# Patient Record
Sex: FEMALE | Race: WHITE | URBAN - NONMETROPOLITAN AREA
[De-identification: names, ages, dates, MRNs, and addresses within clinical notes are randomized per-mention and may not be internally consistent; named-entity substitution may affect disease eponyms.]

---
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
## 2023-05-02 ENCOUNTER — OFFICE VISIT (OUTPATIENT)
Dept: URGENT CARE | Facility: CLINIC | Age: 18
End: 2023-05-02

## 2023-05-02 VITALS
DIASTOLIC BLOOD PRESSURE: 74 MMHG | OXYGEN SATURATION: 96 % | RESPIRATION RATE: 18 BRPM | HEART RATE: 94 BPM | HEIGHT: 67 IN | SYSTOLIC BLOOD PRESSURE: 137 MMHG | WEIGHT: 135 LBS | TEMPERATURE: 97.4 F | BODY MASS INDEX: 21.19 KG/M2

## 2023-05-02 DIAGNOSIS — Z02.4 ENCOUNTER FOR DRIVER'S LICENSE HISTORY AND PHYSICAL: Primary | ICD-10-CM

## 2023-05-02 NOTE — PROGRESS NOTES
[facility]        NAME: Alon Brown is a 19 y o  female  : 2005    MRN: [id number]  DATE: May 2, 2023  TIME: 2:08 PM    Assessment and Plan   Encounter for 's license history and physical [Z02 4]  1  Encounter for 's license history and physical              Patient Instructions       Follow up with PCP in 3-5 days  Proceed to  ER if symptoms worsen  Chief Complaint     Chief Complaint   Patient presents with    Annual Exam     Drivers permit physical         History of Present Illness       Patient is a [phone] presenting for a drivers permit physical  She has no past medical history and does not take any daily medications  Review of Systems   Review of Systems   Constitutional: Negative for chills and fever  HENT: Negative for ear pain and sore throat  Eyes: Negative for pain and visual disturbance  Respiratory: Negative for cough and shortness of breath  Cardiovascular: Negative for chest pain and palpitations  Gastrointestinal: Negative for abdominal pain and vomiting  Genitourinary: Negative for dysuria and hematuria  Musculoskeletal: Negative for arthralgias and back pain  Skin: Negative for color change and rash  Neurological: Negative for seizures and syncope  All other systems reviewed and are negative          Current Medications       Current Outpatient Medications:     norethindrone (Ortho Micronor) 0 35 MG tablet, Take 1 tablet (0 35 mg total) by mouth daily, Disp: 28 tablet, Rfl: 11    naproxen (NAPROSYN) 250 mg tablet, Take 250 mg by mouth 2 (two) times a day with meals (Patient not taking: Reported on 2023), Disp: , Rfl:     naproxen (NAPROSYN) 250 mg tablet, Take 1 tablet (250 mg total) by mouth 2 (two) times a day as needed for mild pain (Patient not taking: Reported on 2023), Disp: 60 tablet, Rfl: 6    phenazopyridine (PYRIDIUM) 200 mg tablet, Take 1 tablet (200 mg total) by mouth 3 (three) times a day with meals "(Patient not taking: Reported on 5/2/2023), Disp: 10 tablet, Rfl: 0    Vienva 0 1-20 MG-MCG per tablet, , Disp: , Rfl:     Current Allergies     Allergies as of 05/02/2023    (No Known Allergies)            The following portions of the patient's history were reviewed and updated as appropriate: allergies, current medications, past family history, past medical history, past social history, past surgical history and problem list      Past Medical History:   Diagnosis Date    Known health problems: none        Past Surgical History:   Procedure Laterality Date    NO PAST SURGERIES         Family History   Problem Relation Age of Onset    Diverticulitis Mother     COPD Mother     Rheum arthritis Mother     Stroke Father     Hypertension Father          Medications have been verified  Objective   /74   Pulse 94   Temp (!) 97 4 °F (36 3 °C)   Resp 18   Ht 5' 7\" (1 702 m)   Wt 61 2 kg (135 lb)   LMP 04/25/2023   SpO2 96%   BMI 21 14 kg/m²        Physical Exam     Physical Exam  Constitutional:       General: She is not in acute distress  Appearance: Normal appearance  She is not ill-appearing or toxic-appearing  HENT:      Right Ear: Tympanic membrane normal       Left Ear: Tympanic membrane normal       Mouth/Throat:      Mouth: Mucous membranes are moist       Pharynx: Oropharynx is clear  Eyes:      Pupils: Pupils are equal, round, and reactive to light  Cardiovascular:      Rate and Rhythm: Normal rate and regular rhythm  Heart sounds: Normal heart sounds  Pulmonary:      Effort: Pulmonary effort is normal       Breath sounds: Normal breath sounds  Abdominal:      General: Abdomen is flat  Palpations: Abdomen is soft  Musculoskeletal:         General: Normal range of motion  Skin:     General: Skin is warm and dry  Neurological:      General: No focal deficit present  Mental Status: She is alert and oriented to person, place, and time                     "

## 2023-10-08 ENCOUNTER — OFFICE VISIT (OUTPATIENT)
Dept: URGENT CARE | Facility: CLINIC | Age: 18
End: 2023-10-08
Payer: COMMERCIAL

## 2023-10-08 VITALS
OXYGEN SATURATION: 99 % | BODY MASS INDEX: 24.16 KG/M2 | WEIGHT: 145 LBS | RESPIRATION RATE: 18 BRPM | SYSTOLIC BLOOD PRESSURE: 123 MMHG | HEART RATE: 78 BPM | DIASTOLIC BLOOD PRESSURE: 68 MMHG | TEMPERATURE: 96.5 F | HEIGHT: 65 IN

## 2023-10-08 DIAGNOSIS — B35.3 ATHLETE'S FOOT ON RIGHT: Primary | ICD-10-CM

## 2023-10-08 PROCEDURE — 99211 OFF/OP EST MAY X REQ PHY/QHP: CPT

## 2023-10-08 NOTE — PROGRESS NOTES
[facility]        NAME: Awilda Garza is a 19 y.o. female  : 2005    MRN: [id number]  DATE: 2023  TIME: 1:50 PM    Assessment and Plan   Athlete's foot on right [B35.3]  1. Athlete's foot on right              Patient Instructions   Use over the counter athlete's foot cream such as lotrimin or lamisil  Allow your feet to breathe. Follow up with PCP in 3-5 days. Proceed to  ER if symptoms worsen. Chief Complaint     Chief Complaint   Patient presents with   • Rash     18 days ago, white patches formed on knuckles on patients toes on RIGHT foot. She used a lotion for exemia which helped clear it up but then in between and on the back of her toes is white and peeling, it is also burning. She has since then stopped using the cream.           History of Present Illness       Patient is a 47 presenting with a rash in between the toes of her right foot. She tried to use a lotion for eczema but it did not help. Rash        Review of Systems   Review of Systems   Skin: Positive for rash. All other systems reviewed and are negative.         Current Medications       Current Outpatient Medications:   •  naproxen (NAPROSYN) 250 mg tablet, Take 250 mg by mouth 2 (two) times a day with meals, Disp: , Rfl:   •  naproxen (NAPROSYN) 250 mg tablet, Take 1 tablet (250 mg total) by mouth 2 (two) times a day as needed for mild pain, Disp: 60 tablet, Rfl: 6  •  norethindrone (Ortho Micronor) 0.35 MG tablet, Take 1 tablet (0.35 mg total) by mouth daily, Disp: 28 tablet, Rfl: 11  •  phenazopyridine (PYRIDIUM) 200 mg tablet, Take 1 tablet (200 mg total) by mouth 3 (three) times a day with meals (Patient not taking: Reported on 2023), Disp: 10 tablet, Rfl: 0  •  Vienva 0.1-20 MG-MCG per tablet, , Disp: , Rfl:     Current Allergies     Allergies as of 10/08/2023   • (No Known Allergies)            The following portions of the patient's history were reviewed and updated as appropriate: allergies, current medications, past family history, past medical history, past social history, past surgical history and problem list.     Past Medical History:   Diagnosis Date   • Known health problems: none        Past Surgical History:   Procedure Laterality Date   • NO PAST SURGERIES         Family History   Problem Relation Age of Onset   • Diverticulitis Mother    • COPD Mother    • Rheum arthritis Mother    • Stroke Father    • Hypertension Father          Medications have been verified. Objective   /68   Pulse 78   Temp (!) 96.5 °F (35.8 °C)   Resp 18   Ht 5' 5" (1.651 m)   Wt 65.8 kg (145 lb)   LMP 10/04/2023 (Approximate)   SpO2 99%   BMI 24.13 kg/m²        Physical Exam     Physical Exam  Vitals and nursing note reviewed. Constitutional:       General: She is not in acute distress. Appearance: Normal appearance. She is normal weight. She is not ill-appearing or toxic-appearing. Skin:     Findings: Rash present. Neurological:      Mental Status: She is alert.

## 2023-11-21 ENCOUNTER — OFFICE VISIT (OUTPATIENT)
Dept: URGENT CARE | Facility: CLINIC | Age: 18
End: 2023-11-21
Payer: COMMERCIAL

## 2023-11-21 VITALS
BODY MASS INDEX: 24.16 KG/M2 | WEIGHT: 145 LBS | SYSTOLIC BLOOD PRESSURE: 144 MMHG | DIASTOLIC BLOOD PRESSURE: 66 MMHG | TEMPERATURE: 96.8 F | HEART RATE: 98 BPM | OXYGEN SATURATION: 100 % | HEIGHT: 65 IN | RESPIRATION RATE: 18 BRPM

## 2023-11-21 DIAGNOSIS — L85.3 DRY SKIN: ICD-10-CM

## 2023-11-21 DIAGNOSIS — B35.3 TINEA PEDIS OF BOTH FEET: Primary | ICD-10-CM

## 2023-11-21 PROCEDURE — 99213 OFFICE O/P EST LOW 20 MIN: CPT | Performed by: PHYSICIAN ASSISTANT

## 2023-11-21 NOTE — PROGRESS NOTES
[facility]        NAME: Abiodun Henning is a 19 y.o. female  : 2005    MRN: [id number]  DATE: 2023  TIME: 2:40 PM    Assessment and Plan   Tinea pedis of both feet [B35.3]  1. Tinea pedis of both feet        2. Dry skin              Patient Instructions   Patient to continue treatment to affected areas 1 week after resolution of rash    Follow up with PCP in 3-5 days. Proceed to  ER if symptoms worsen. Chief Complaint     Chief Complaint   Patient presents with    Hand Pain    Foot Problem     Was treating feet for athletes foot and feet are improving but still have spots. New spot on right hand pointer finger. History of Present Illness       78-year-old female significant past medical history presents the office for follow-up for athlete's foot to her. States symptoms are greatly improving with the over-the-counter treatment. States she now sees a small area of faint dry skin to her right index finger and is concerned that she may have athlete's foot there. Review of Systems   Review of Systems   Skin:  Positive for rash.          Current Medications       Current Outpatient Medications:     naproxen (NAPROSYN) 250 mg tablet, Take 250 mg by mouth 2 (two) times a day with meals, Disp: , Rfl:     naproxen (NAPROSYN) 250 mg tablet, Take 1 tablet (250 mg total) by mouth 2 (two) times a day as needed for mild pain, Disp: 60 tablet, Rfl: 6    norethindrone (Ortho Micronor) 0.35 MG tablet, Take 1 tablet (0.35 mg total) by mouth daily, Disp: 28 tablet, Rfl: 11    phenazopyridine (PYRIDIUM) 200 mg tablet, Take 1 tablet (200 mg total) by mouth 3 (three) times a day with meals (Patient not taking: Reported on 2023), Disp: 10 tablet, Rfl: 0    Vienva 0.1-20 MG-MCG per tablet, , Disp: , Rfl:     Current Allergies     Allergies as of 2023    (No Known Allergies)            The following portions of the patient's history were reviewed and updated as appropriate: allergies, current medications, past family history, past medical history, past social history, past surgical history and problem list.     Past Medical History:   Diagnosis Date    Known health problems: none        Past Surgical History:   Procedure Laterality Date    NO PAST SURGERIES         Family History   Problem Relation Age of Onset    Diverticulitis Mother     COPD Mother     Rheum arthritis Mother     Stroke Father     Hypertension Father          Medications have been verified. Objective   /66   Pulse 98   Temp (!) 96.8 °F (36 °C)   Resp 18   Ht 5' 5" (1.651 m)   Wt 65.8 kg (145 lb)   SpO2 100%   BMI 24.13 kg/m²   No LMP recorded. Physical Exam     Physical Exam  Vitals and nursing note reviewed. Constitutional:       Appearance: She is well-developed. HENT:      Head: Normocephalic and atraumatic. Right Ear: External ear normal.      Left Ear: External ear normal.      Nose: Nose normal.   Eyes:      General: Lids are normal.      Conjunctiva/sclera: Conjunctivae normal.   Skin:     General: Skin is warm and dry. Capillary Refill: Capillary refill takes less than 2 seconds. Comments: Very faint areas of dry skin burst dried athlete's foot product on skin. No erythema, desquamation, or other abnormality. Neurological:      Mental Status: She is alert.

## 2025-07-08 ENCOUNTER — OFFICE VISIT (OUTPATIENT)
Dept: URGENT CARE | Facility: CLINIC | Age: 20
End: 2025-07-08

## 2025-07-08 VITALS
BODY MASS INDEX: 21.99 KG/M2 | DIASTOLIC BLOOD PRESSURE: 83 MMHG | OXYGEN SATURATION: 98 % | HEIGHT: 65 IN | HEART RATE: 92 BPM | RESPIRATION RATE: 18 BRPM | TEMPERATURE: 96.9 F | WEIGHT: 132 LBS | SYSTOLIC BLOOD PRESSURE: 126 MMHG

## 2025-07-08 DIAGNOSIS — J02.9 SORE THROAT: Primary | ICD-10-CM

## 2025-07-08 LAB — S PYO AG THROAT QL: NEGATIVE

## 2025-07-08 PROCEDURE — 99214 OFFICE O/P EST MOD 30 MIN: CPT

## 2025-07-08 PROCEDURE — 87880 STREP A ASSAY W/OPTIC: CPT

## 2025-07-08 PROCEDURE — 87070 CULTURE OTHR SPECIMN AEROBIC: CPT

## 2025-07-08 RX ORDER — AMOXICILLIN 500 MG/1
500 CAPSULE ORAL EVERY 12 HOURS SCHEDULED
Qty: 20 CAPSULE | Refills: 0 | Status: SHIPPED | OUTPATIENT
Start: 2025-07-08 | End: 2025-07-18

## 2025-07-08 RX ORDER — FLUTICASONE PROPIONATE 50 MCG
1 SPRAY, SUSPENSION (ML) NASAL DAILY
Qty: 11.1 ML | Refills: 0 | Status: SHIPPED | OUTPATIENT
Start: 2025-07-08

## 2025-07-08 NOTE — PROGRESS NOTES
[facility]  Name: Manda Garcia      : 2005      MRN: [id number]  Encounter Provider: Asif Hughes PA-C  Encounter Date: 2025   Encounter department: [facility] [place]  :  Assessment & Plan  Sore throat    Orders:    POCT rapid ANTIGEN strepA    Throat culture; Future    amoxicillin (AMOXIL) 500 mg capsule; Take 1 capsule (500 mg total) by mouth every 12 (twelve) hours for 10 days    fluticasone (FLONASE) 50 mcg/act nasal spray; 1 spray into each nostril daily    Rapid strep negative.  Sent for culture.  Currently meeting 3-4 center criteria, start empiric treatment.    Patient Instructions  Follow up with PCP in 3-5 days.  Proceed to  ER if symptoms worsen.    If tests are performed, our office will contact you with results only if changes need to made to the care plan discussed with you at the visit. [facility]    Chief Complaint:   Chief Complaint   Patient presents with    Sore Throat     Pt reports sore throat with enlarged tonsils for the past 4 days     History of Present Illness   21-year-old female presenting with bilateral throat pain and tonsil swelling x 4 days.  States that she can see is that her tonsils are swollen and sore and having spots on them.  Also reporting swollen nodes in her neck.  Denies any fevers cough congestion runny nose nausea vomiting diarrhea.  States he denies and not have any sick contacts either.          Review of Systems   Constitutional:  Negative for chills, diaphoresis, fatigue and fever.   HENT:  Positive for postnasal drip and sore throat. Negative for congestion, ear discharge, ear pain, nosebleeds, rhinorrhea and sinus pressure.    Respiratory:  Negative for cough and choking.    Cardiovascular:  Negative for chest pain and palpitations.   Gastrointestinal:  Negative for diarrhea, nausea and vomiting.   Musculoskeletal:  Negative for arthralgias and myalgias.   Skin:  Negative for  "color change.   Neurological:  Negative for dizziness, light-headedness and headaches.     Past Medical History   Past Medical History[1]  Past Surgical History[2]  Family History[3]  she reports that she has never smoked. She has never used smokeless tobacco. She reports that she does not currently use alcohol. She reports that she does not currently use drugs.  Current Outpatient Medications   Medication Instructions    naproxen (NAPROSYN) 250 mg, 2 times daily with meals    naproxen (NAPROSYN) 250 mg, Oral, 2 times daily PRN    norethindrone (ORTHO MICRONOR) 0.35 mg, Oral, Daily    phenazopyridine (PYRIDIUM) 200 mg, Oral, 3 times daily with meals    Vienva 0.1-20 MG-MCG per tablet No dose, route, or frequency recorded.   Allergies[4]     Objective   /83   Pulse 92   Temp (!) 96.9 °F (36.1 °C)   Resp 18   Ht 5' 5\" (1.651 m)   Wt 59.9 kg (132 lb)   LMP 06/30/2025   SpO2 98%   BMI 21.97 kg/m²      Physical Exam  Vitals and nursing note reviewed.   Constitutional:       General: She is not in acute distress.     Appearance: She is normal weight.   HENT:      Head: Normocephalic and atraumatic.      Right Ear: Tympanic membrane, ear canal and external ear normal.      Left Ear: Tympanic membrane, ear canal and external ear normal.      Nose: Nose normal. No congestion.      Mouth/Throat:      Mouth: Mucous membranes are moist.      Pharynx: Oropharynx is clear. Posterior oropharyngeal erythema present. No oropharyngeal exudate.      Tonsils: 2+ on the right. 2+ on the left.     Eyes:      General:         Right eye: No discharge.         Left eye: No discharge.      Conjunctiva/sclera: Conjunctivae normal.      Pupils: Pupils are equal, round, and reactive to light.       Cardiovascular:      Rate and Rhythm: Normal rate and regular rhythm.      Pulses: Normal pulses.      Heart sounds: Normal heart sounds.   Pulmonary:      Effort: Pulmonary effort is normal. No respiratory distress.      Breath sounds: " "Normal breath sounds. No wheezing.   Abdominal:      General: Abdomen is flat. Bowel sounds are normal.      Tenderness: There is no abdominal tenderness.   Lymphadenopathy:      Cervical: Cervical adenopathy present.     Skin:     General: Skin is warm.      Capillary Refill: Capillary refill takes less than 2 seconds.     Neurological:      General: No focal deficit present.      Mental Status: She is alert and oriented to person, place, and time.     Psychiatric:         Mood and Affect: Mood normal.         Behavior: Behavior normal.         Portions of the record may have been created with voice recognition software.  Occasional wrong word or \"sound a like\" substitutions may have occurred due to the inherent limitations of voice recognition software.  Read the chart carefully and recognize, using context, where substitutions have occurred.       [1]   Past Medical History:  Diagnosis Date    Known health problems: none    [2]   Past Surgical History:  Procedure Laterality Date    NO PAST SURGERIES     [3]   Family History  Problem Relation Name Age of Onset    Diverticulitis Mother      COPD Mother      Rheum arthritis Mother      Stroke Father      Hypertension Father     [4] No Known Allergies    "

## 2025-07-08 NOTE — PATIENT INSTRUCTIONS
Take amoxicillin as prescribed  Boil toothbrush each night and replace after 2-3 of treatment  Fluids and rest  Salt water gargles and chloraseptic spray  Throat Coat Tea  Wash hands frequently  Don't share drinks  Tylenol/Ibuprofen for pain/fever

## 2025-07-10 LAB — BACTERIA THROAT CULT: NORMAL
